# Patient Record
Sex: FEMALE | Race: WHITE | Employment: FULL TIME | URBAN - METROPOLITAN AREA
[De-identification: names, ages, dates, MRNs, and addresses within clinical notes are randomized per-mention and may not be internally consistent; named-entity substitution may affect disease eponyms.]

---

## 2019-03-13 ENCOUNTER — HOSPITAL ENCOUNTER (EMERGENCY)
Age: 64
Discharge: HOME | End: 2019-03-14
Attending: EMERGENCY MEDICINE | Admitting: EMERGENCY MEDICINE
Payer: SELF-PAY

## 2019-03-13 DIAGNOSIS — N89.8 VAGINAL DISCHARGE: Primary | ICD-10-CM

## 2019-03-13 PROCEDURE — 99284 EMERGENCY DEPT VISIT MOD MDM: CPT

## 2019-03-14 VITALS
TEMPERATURE: 97.6 F | HEART RATE: 78 BPM | DIASTOLIC BLOOD PRESSURE: 88 MMHG | RESPIRATION RATE: 16 BRPM | OXYGEN SATURATION: 97 % | SYSTOLIC BLOOD PRESSURE: 124 MMHG

## 2019-03-14 LAB
APPEARANCE UR: CLEAR
BILIRUB UR QL: NEGATIVE
C TRACH RRNA SPEC QL NAA+PROBE: NEGATIVE
COLOR UR: YELLOW
GLUCOSE UR STRIP.AUTO-MCNC: NEGATIVE MG/DL
HGB UR QL STRIP: NEGATIVE
KETONES UR QL STRIP.AUTO: ABNORMAL MG/DL
LEUKOCYTE ESTERASE UR QL STRIP.AUTO: NEGATIVE
N GONORRHOEA RRNA SPEC QL NAA+PROBE: NEGATIVE
NITRITE UR QL STRIP.AUTO: NEGATIVE
PH UR STRIP: 5.5 [PH] (ref 5–8)
PROT UR STRIP-MCNC: NEGATIVE MG/DL
SERVICE CMNT-IMP: NORMAL
SP GR UR REFRACTOMETRY: >1.03 (ref 1–1.03)
SPECIMEN SOURCE: NORMAL
UROBILINOGEN UR QL STRIP.AUTO: 1 EU/DL (ref 0.2–1)
WET PREP GENITAL: NORMAL

## 2019-03-14 PROCEDURE — 81003 URINALYSIS AUTO W/O SCOPE: CPT

## 2019-03-14 PROCEDURE — 87491 CHLMYD TRACH DNA AMP PROBE: CPT

## 2019-03-14 PROCEDURE — 87210 SMEAR WET MOUNT SALINE/INK: CPT

## 2019-03-14 NOTE — ED PROVIDER NOTES
EMERGENCY DEPARTMENT HISTORY AND PHYSICAL EXAM    12:39 AM      Date: 3/13/2019  Patient Name: Luci Schmitt    History of Presenting Illness     Chief Complaint   Patient presents with   [facility] Vaginal Discharge    Foreign Body in Vagina         History Provided By: Patient    Additional History (Context): Luci Schmitt is a 61 y.o. female with no signficant PMHx who presents to the ED for evaluation of a severe, constant vaginal odor onset \"several months\". Pt states that she has not been sexually active in many years but has been experiencing a vaginal odor. Reports people were noticing so she placed a rolled up panty liner near her vagina at 10 AM on 3/13/19. Pt states that she was unable to find the panty liner later in the day and is unsure if it may have traveled into her vagina. She says she is experiencing some tightness in her vagina. Notes she has an appointment at the clinic with a  Gynecologist on 4/15/19 but wanted to have this evaluated sooner. She has no known drug allergies. No reported hx of smoking, etoh or drug use. Associated sx include the possible foreign body in her vagina and some nausea over the past few days. Denies any fever, chills, CP, SOB, abdominal pain, nausea, vomiting, diarrhea and any urinary sx. No further concerns or complaints at this time. PCP: None        Chief Complaint: Vaginal Odor  Duration:  \"several months\"  Timing:  Constant  Location:   Quality: Not reported with odor, tightness experienced in vagina today with concern for foreign body. Severity: Severe  Modifying Factors: Pt placed a rolled up panty liner near her vagina to assist with the odor. Associated Symptoms: possibility of a foreign body in her vagina and some nausea over the past few days      Current Outpatient Medications   Medication Sig Dispense Refill    Cetirizine (ZYRTEC) 10 mg cap Take 1 Cap by mouth. Past History     Past Medical History:  History reviewed.  No pertinent past medical history. Past Surgical History:  History reviewed. No pertinent surgical history. Family History:  History reviewed. No pertinent family history. Social History:  Social History     Tobacco Use    Smoking status: Not on file   Substance Use Topics    Alcohol use: Not on file    Drug use: Not on file       Allergies:  No Known Allergies      Review of Systems       Review of Systems   Constitutional: Negative for fever. HENT: Negative for congestion. Respiratory: Negative for cough and shortness of breath. Cardiovascular: Negative for chest pain. Gastrointestinal: Positive for nausea (over past few days). Negative for abdominal pain and vomiting. Genitourinary:        Positive for vaginal odor  Positive for possible foreign body in vagina   Musculoskeletal: Negative for back pain. Skin: Negative for rash. Neurological: Negative for light-headedness. All other systems reviewed and are negative. Physical Exam     Visit Vitals  /88   Pulse 78   Temp 97.6 °F (36.4 °C)   Resp 16   SpO2 97%         Physical Exam   Constitutional: She is oriented to person, place, and time. She appears well-developed. HENT:   Head: Normocephalic. Mouth/Throat: Oropharynx is clear and moist.   Eyes: Pupils are equal, round, and reactive to light. Neck: Normal range of motion. Cardiovascular: Normal rate and normal heart sounds. No murmur heard. Pulmonary/Chest: Effort normal. She has no wheezes. She has no rales. Abdominal: Soft. There is no tenderness. Genitourinary:   Genitourinary Comments: Pelvic exam chaperoned by RN Marybeth Schwab, no foreign body present in vagina. Minimal white discharge present. Musculoskeletal: Normal range of motion. She exhibits no edema. Neurological: She is alert and oriented to person, place, and time. Skin: Skin is warm and dry. Nursing note and vitals reviewed. Diagnostic Study Results     Vitals:  No data found.       Medications ordered:   Medications - No data to display      Lab findings:  No results found for this or any previous visit (from the past 12 hour(s)). X-Ray, CT or other radiology findings or impressions:  No orders to display       Progress notes, Consult notes or additional Procedure notes:   No emc, stable for dc and close f/u. Det ret inst given. Pt agrees w dc plan. Disposition:  Diagnosis:   1. Vaginal discharge        Disposition: home    Follow-up Information     Follow up With Specialties Details [facility]  [address] [place] [address]    Nabila Torres MD Obstetrics & Gynecology In 2 days  [address] [place] [address]  [phone]                Medication List      ASK your doctor about these medications    ZyrTEC 10 mg Cap  Generic drug:  Cetirizine              Scribe Attestation     Marco Lesser acting as a scribe for and in the presence of Akin Solitario MD      March 14, 2019 at 12:39 AM       Provider Attestation:      I personally performed the services described in the documentation, reviewed the documentation, as recorded by the scribe in my presence, and it accurately and completely records my words and actions.  March 14, 2019 at 12:39 AM - Akin Solitario MD

## 2019-03-14 NOTE — ED NOTES
Patient instructed of need for a clean catch urine specimen. Patient verbalized understanding of. Cleansing wipes and sterile urine cup provided. The patient denies pelvic pain, and admits to vaginal discharge. Patient request to not having speculum exam, but to do a self swab vaginal collection for testing. Patient instructed/educated on  proper way to collect self swab vaginal specimen collection, per protocol, to included that ED nurse will transfer into proper specimen tubes and label in front of patient. Patient informed that nurse will help if needed. Patient verbalized understanding of and states she feels comfortable in doing self swab procedure. Provider informed.       swab

## 2019-03-14 NOTE — ED TRIAGE NOTES
Patient states she has not had sex in 25 years but she has had a vaginal odor x 1 week. She admits to rolling up a panty liner and placing it within her labias and state she forgot about it and feels like it went up into her vagina.